# Patient Record
Sex: FEMALE | Race: WHITE | NOT HISPANIC OR LATINO | Employment: STUDENT | URBAN - METROPOLITAN AREA
[De-identification: names, ages, dates, MRNs, and addresses within clinical notes are randomized per-mention and may not be internally consistent; named-entity substitution may affect disease eponyms.]

---

## 2022-09-28 PROCEDURE — U0004 COV-19 TEST NON-CDC HGH THRU: HCPCS | Performed by: FAMILY MEDICINE

## 2023-08-21 ENCOUNTER — OFFICE VISIT (OUTPATIENT)
Dept: OBSTETRICS AND GYNECOLOGY | Facility: CLINIC | Age: 23
End: 2023-08-21
Payer: COMMERCIAL

## 2023-08-21 VITALS
SYSTOLIC BLOOD PRESSURE: 125 MMHG | HEIGHT: 64 IN | WEIGHT: 129.6 LBS | DIASTOLIC BLOOD PRESSURE: 86 MMHG | BODY MASS INDEX: 22.13 KG/M2 | HEART RATE: 96 BPM

## 2023-08-21 DIAGNOSIS — Z01.818 PRE-PROCEDURAL EXAMINATION: ICD-10-CM

## 2023-08-21 DIAGNOSIS — Z30.09 BIRTH CONTROL COUNSELING: Primary | ICD-10-CM

## 2023-08-21 PROCEDURE — 99203 OFFICE O/P NEW LOW 30 MIN: CPT | Performed by: OBSTETRICS & GYNECOLOGY

## 2023-08-21 RX ORDER — ALPRAZOLAM 0.5 MG/1
0.5 TABLET ORAL ONCE
Qty: 1 TABLET | Refills: 0 | Status: SHIPPED | OUTPATIENT
Start: 2023-08-21 | End: 2023-08-21

## 2023-08-21 NOTE — PROGRESS NOTES
Chief Complaint  New Gyn (Patient is here today for a birth control consult- would like an IUD unsure of which one. )    Subjective        Lucinda Alexadner presents to [facility]  History of Present Illness  Patient is here to discuss contraception.  She has never been sexually active previously.  She has never used any form of contraception previously.  She is interested in an IUD.  Her close friend has a Kyleena IUD, and she thinks she would like the same thing.  Patient does currently use tampons but she has a period and does well with these.  She has never previously had a pelvic exam performed.  She reports menstrual cycles that are about 7 days long.  They are moderate in flow.  She reports minimal cramping.  The patient is currently enrolled in college at the [employer].  She says she has started dating someone and thinks that she may become sexually active, and would like contraception before this starts.    OB Hx: G0 - never sexually active    History reviewed. No pertinent surgical history.    History reviewed. No pertinent past medical history.    Social History     Tobacco Use    Smoking status: Never    Smokeless tobacco: Never   Vaping Use    Vaping Use: Never used   Substance Use Topics    Alcohol use: Yes    Drug use: Defer     Family History   Problem Relation Age of Onset    Breast cancer Neg Hx     Ovarian cancer Neg Hx     Colon cancer Neg Hx     Uterine cancer Neg Hx      Current Outpatient Medications on File Prior to Visit   Medication Sig    azithromycin (Zithromax Z-Yordan) 250 MG tablet Take 2 tablets the first day, then 1 tablet daily for 4 days.     No current facility-administered medications on file prior to visit.     No Known Allergies    Review of systems:  Constitutional: No fevers, chills, sweats   Eye: No recent visual problems, denies blurry vision   HEENT: No ear pain, nasal congestion, sore throat, voice changes   Respiratory: No shortness of breath,  "cough, pain on breathing   Cardiovascular: No Chest pain, palpitations   Gastrointestinal: No nausea, vomiting, diarrhea, constipation   Genitourinary: No hematuria, dysuria, lesions on genitalia   Hema/Lymph: Negative for bruising, no edema   Endocrine: Negative for excessive thirst, excessive hunger, heat or cold intolerance   Musculoskeletal: No joint pain, muscle pain, decreased range of motion   Integumentary: No rash, pruritus, abrasions, lesions   Neurologic: No weakness, numbness, headaches   Psychiatric: No anxiety, depression, mood changes       Objective   Vital Signs:  /86   Pulse 96   Ht 162.6 cm (64\")   Wt 58.8 kg (129 lb 9.6 oz)   BMI 22.25 kg/mý   Estimated body mass index is 22.25 kg/mý as calculated from the following:    Height as of this encounter: 162.6 cm (64\").    Weight as of this encounter: 58.8 kg (129 lb 9.6 oz).  Facility age limit for growth percentiles is 20 years.    BMI is within normal parameters. No other follow-up for BMI required.      Physical Exam   General: No acute distress, awake and oriented x3  Psychiatric: good judgment and insight, normal mood  Neurological: cranial nerves II through XII intact, no deficits    Result Review :                   Assessment and Plan   Diagnoses and all orders for this visit:    1. Birth control counseling (Primary)    2. Pre-procedural examination  -     ALPRAZolam (Xanax) 0.5 MG tablet; Take 1 tablet by mouth 1 (One) Time for 1 dose. Prior to IUD insertion  Dispense: 1 tablet; Refill: 0    Various contraceptive options discussed including combined oral contraceptives, contraceptive patch, NuvaRing, progesterone only contraceptive, Depo-Provera, Nexplanon, progesterone IUD, copper IUD, and barrier methods. Patient elects progesterone intrauterine device. Risks, benefits, alternatives discussed including risks of bleeding, infection, subfertility, infertility, scarring, pelvic pain, irregular bleeding, amenorrhea, dyspareunia, " migration/perforation/expulsion of device, possible need for surgery for repair, unwanted/ectopic pregnancy, weight gain, and need for surgical removal of device. All questions answered.  Given her nulligravida status, I feel she would do better likely with Kyleena and then with Mirena given the smaller size of the device.  Educational materials on Kyleena were provided to the patient.  I would recommend insertion during her menstrual cycle.  We will obtain prior authorization for the device and contact the patient once we have received this.  She should then schedule during her menstrual cycle.  Recommend that she take ibuprofen 30 minutes prior to the insertion time.  I will also send in a prescription for Xanax x1 which she may take just prior to insertion to help with discomfort with the exam.  I recommend that she have someone bring her to the next appointment so she does not have to drive home.  PDMP queried and appropriate.    I spent 30 minutes today on the care of this patient, including review of the chart and any pertinent prior imaging and labs, interview/exam of the patient, developing care plan, and counseling the patient on management options and excluding any time spent on other separate billable services such as performing procedures or test interpretation, if applicable.           Follow Up   Return After PA for Kyleena.  Patient was given instructions and counseling regarding her condition or for health maintenance advice. Please see specific information pulled into the AVS if appropriate.

## 2023-08-22 ENCOUNTER — TELEPHONE (OUTPATIENT)
Dept: OBSTETRICS AND GYNECOLOGY | Facility: CLINIC | Age: 23
End: 2023-08-22
Payer: COMMERCIAL

## 2023-08-22 NOTE — TELEPHONE ENCOUNTER
Caller: Lucinda Alexander    Relationship: Self    Best call back number: [phone]     What form or medical record are you requesting: SCHOOL NOTE    Who is requesting this form or medical record from you: PROFESSOR     How would you like to receive the form or medical records (pick-up, mail, fax): [facility]      Timeframe paperwork needed: AS SOON AS POSSIBLE     Additional notes: PT IS REQUESTING SCHOOL NOTE FOR NEW GYN APPT ON 08/21/23

## 2023-08-23 ENCOUNTER — PATIENT MESSAGE (OUTPATIENT)
Dept: OBSTETRICS AND GYNECOLOGY | Facility: CLINIC | Age: 23
End: 2023-08-23
Payer: COMMERCIAL

## 2023-08-23 ENCOUNTER — PATIENT ROUNDING (BHMG ONLY) (OUTPATIENT)
Dept: OBSTETRICS AND GYNECOLOGY | Facility: CLINIC | Age: 23
End: 2023-08-23
Payer: COMMERCIAL

## 2023-08-28 ENCOUNTER — TELEPHONE (OUTPATIENT)
Dept: OBSTETRICS AND GYNECOLOGY | Facility: CLINIC | Age: 23
End: 2023-08-28
Payer: COMMERCIAL

## 2023-08-28 NOTE — TELEPHONE ENCOUNTER
LM for patient to call back and schedule an u/s guided IUD insertion. Needs to be on her cycle at the time of appointment.

## 2023-08-29 ENCOUNTER — TELEPHONE (OUTPATIENT)
Dept: OBSTETRICS AND GYNECOLOGY | Facility: CLINIC | Age: 23
End: 2023-08-29
Payer: COMMERCIAL

## 2023-08-29 NOTE — TELEPHONE ENCOUNTER
Patient received call that IUD was in. Said her cycle will either be the first or second week in September. Could not find open appt with Dr Carrera.until Oct 2 at [place] location. Would you please look at his scheduled and see if you can find an appointment with Dr Resendiz and ultrasound. I will not be here rest of week. If you would please forward to Helen or Jayna would really appreciate it. Thank You.Pt Ph [phone]

## 2023-08-30 NOTE — TELEPHONE ENCOUNTER
Pt states she has started cycle and is wanting to schedule for insertion. She can only do after 3 or before 11 due to being in school. Pt says she is able to do next tues 9/5, there are currently no openings would you be ok with pt being added on?    If not, I can schedule pt another day. Does she still need to be on cycle or at least 2 wks no intercourse?    Please advise,   Thank you!

## 2023-08-31 ENCOUNTER — OFFICE VISIT (OUTPATIENT)
Dept: OBSTETRICS AND GYNECOLOGY | Facility: CLINIC | Age: 23
End: 2023-08-31
Payer: COMMERCIAL

## 2023-08-31 VITALS
WEIGHT: 133 LBS | BODY MASS INDEX: 22.71 KG/M2 | HEIGHT: 64 IN | DIASTOLIC BLOOD PRESSURE: 88 MMHG | SYSTOLIC BLOOD PRESSURE: 135 MMHG

## 2023-08-31 DIAGNOSIS — Z30.430 ENCOUNTER FOR IUD INSERTION: Primary | ICD-10-CM

## 2023-08-31 LAB
B-HCG UR QL: NEGATIVE
EXPIRATION DATE: NORMAL
INTERNAL NEGATIVE CONTROL: NORMAL
INTERNAL POSITIVE CONTROL: NORMAL
Lab: NORMAL

## 2023-08-31 NOTE — PROGRESS NOTES
CC: Here for IUD insertion    Procedure: Intrauterine device insertion     LNMP: 8/30/23   Pregnancy Test: negative  Current Contraception Method: condoms      Chief Complaint: IUD insertion.  Patient presents for IUD insertion.  She appears stable today and agrees to proceed with IUD placement.      Pre procedure indication 1) Desires Kyleena   Post procedure indication 1) Desires Kyleena     Counseling:  Patient was counseled on risks of IUD insertion including but not limited to abnormal bleeding, infection, uterine perforation, expulsion, failure of contraception resulting in pregnancy, need for additional procedures and/or need for surgery.  All questions were answered to apparent satisfaction.  She was counseled about the duration of treatment, recommended removal in 5 years.  She was advised to perform monthly string checks and to see evaluation with unexpected changes in bleeding patterns and/or unable to feel the strings. The risks, benefits, and alternatives to IUD were explained at length with the patient. All her questions were answered and consents were signed.    The patient was placed in a dorsal lithotomy position on the examining table in [facility]. A bimanual exam confirmed the uterus was normal in size, anteflexed.  A warmed metal speculum was inserted into the vagina and the cervix was brought into view. IUD was placed under ultrasound.    The cervix was prepped with Betadine. A tenaculum was used to grasp the cervix. The endometrial cavity was then sounded to 7 cm without use of a dilator.    The  was then carefully advanced to the cervical canal into the uterus to the level of the fundus. This was then backed off about 1.5-2 cm to allow sufficient space for the arms to open. The device was deployed. The  was removed carefully from the uterus. The threads were then cut leaving 2-3 cm visible outside of the cervix.   Good hemostasis was noted.     All other instruments were removed  from the vagina. There were no complications.  The patient tolerated the procedure well with a minimal amount of discomfort.    The patient was counseled about the need to return in 4 weeks for string check.     She was counseled about the need to use a backup method of contraception such as condoms for 1-2 weeks. The patient is counseled to contact us if she has any significant or increasing bleeding, pain, fever, chills, or other concerns. She is instructed to see a doctor right away if she believes that she may be pregnant at any time with the IUD in place.    Diagnoses and all orders for this visit:    1. Encounter for IUD insertion (Primary)  -     POC Pregnancy, Urine        KATI Canales  8/31/2023  15:15 EDT

## 2023-09-28 ENCOUNTER — OFFICE VISIT (OUTPATIENT)
Dept: OBSTETRICS AND GYNECOLOGY | Facility: CLINIC | Age: 23
End: 2023-09-28
Payer: COMMERCIAL

## 2023-09-28 VITALS
SYSTOLIC BLOOD PRESSURE: 123 MMHG | DIASTOLIC BLOOD PRESSURE: 81 MMHG | BODY MASS INDEX: 22.77 KG/M2 | HEIGHT: 64 IN | WEIGHT: 133.4 LBS

## 2023-09-28 DIAGNOSIS — Z30.431 IUD CHECK UP: Primary | ICD-10-CM

## 2023-09-28 NOTE — PROGRESS NOTES
"Chief Complaint   Patient presents with    Follow-up     Patient is here today to have her IUD strings checked. Kyleena inserted 8/31        SUBJECTIVE:     Patient present today for her IUD string check. She has the kyleena and it was placed on 8/31/23. She reports spotting since placement. She is happy with the IUD. She has questions about TVUS results which indicated PCO ovaries. Prior to Kyleena she had a regular period every 28-30 days.     Review of Systems   Constitutional:  Negative for chills, fatigue and fever.   Gastrointestinal:  Negative for abdominal distention and abdominal pain.   Genitourinary:  Positive for vaginal bleeding. Negative for dyspareunia, dysuria, menstrual problem, pelvic pain, vaginal discharge and vaginal pain.   Musculoskeletal:  Negative for back pain.     OBJECTIVE:   Vitals:    09/28/23 1535   BP: 123/81   Weight: 60.5 kg (133 lb 6.4 oz)   Height: 162.6 cm (64.02\")        Physical Exam  Constitutional:       General: She is not in acute distress.     Appearance: Normal appearance. She is not ill-appearing, toxic-appearing or diaphoretic.   Genitourinary:      Bladder and urethral meatus normal.      No lesions in the vagina.      Right Labia: No rash, tenderness, lesions, skin changes or Bartholin's cyst.     Left Labia: No tenderness, lesions, skin changes, Bartholin's cyst or rash.     No labial fusion noted.      No inguinal adenopathy present in the right or left side.     Vaginal bleeding (small) present.      No vaginal discharge, erythema, tenderness, ulceration or granulation tissue.      No vaginal prolapse present.     No vaginal atrophy present.       Right Adnexa: not tender, not full, not palpable, no mass present and not absent.     Left Adnexa: not tender, not full, not palpable, no mass present and not absent.     No cervical motion tenderness, discharge, friability, lesion, polyp, nabothian cyst or eversion.      IUD strings visualized.      Uterus is not " enlarged, fixed, tender, irregular or prolapsed.      No uterine mass detected.  Abdominal:      General: There is no distension.      Palpations: Abdomen is soft. There is no mass.      Tenderness: There is no abdominal tenderness. There is no guarding.      Hernia: No hernia is present. There is no hernia in the left inguinal area or right inguinal area.   Lymphadenopathy:      Lower Body: No right inguinal adenopathy. No left inguinal adenopathy.   Neurological:      Mental Status: She is alert.   Vitals and nursing note reviewed.       Assessment/Plan    Diagnoses and all orders for this visit:    1. IUD check up (Primary)    IUD strings noted on exam  Discussed expectations for vaginal bleeding following IUD placement  Rev IUD string checks and when to perform. Instructed to call office with any concerns  Reviewed TVUS results with pt, discussed PCO ovaries and what this may suggest. She had regular menses prior to IUD placement  She is due for AE, encouraged to schedule in the next several months.     Follow up: PRN and for AE    I spent 25 minutes caring for Lucinda on this date of service. This time includes time spent by me in the following activities: preparing for the visit, reviewing tests, obtaining and/or reviewing a separately obtained history, performing a medically appropriate examination and/or evaluation, counseling and educating the patient/family/caregiver, ordering medications, tests, or procedures, referring and communicating with other health care professionals, and documenting information in the medical record    Silvia Sosa, APRN  9/28/2023  17:25 EDT

## 2024-02-18 NOTE — PROGRESS NOTES
"GYN ANNUAL EXAM     Chief Complaint   Patient presents with    Annual Exam     AE and first pap and check IUD       HPI    Lucinda is a 23 y.o. female who presents for annual well woman exam. She is a patient of Dr. Carrera.     OB History    No obstetric history on file.         SUBJECTIVE    MENSTRUAL & SEXUAL HEALTH  LMP: No LMP recorded. Patient has had an implant.  Currently sexually active: is  Sexual preference: men  Menses regularity:  n/a - has Kyleena with no periods  Menses length: n/a - has Kyleena with no periods  Dysmenorrhea: n/a - has Kyleena with no periods  Cyclic symptoms: n/a - has Kyleena with no periods  Current contraception: IUD (Kyleena)  Last pap: n/a  History of abnormal pap: n/a  History of STD: No  Family history of cancer: denies       Family history of breast cancer: no  Performs SBE: performs when she can remember.   Incontinence: no  Dyspareunia: no    History reviewed. No pertinent past medical history.    History reviewed. No pertinent surgical history.      Current Outpatient Medications:     levonorgestrel (Kyleena) 19.5 MG intrauterine device IUD, To be inserted one time by prescriber. Route intrauterine., Disp: 1 each, Rfl: 0    spironolactone (ALDACTONE) 100 MG tablet, Take 1 tablet by mouth Daily., Disp: , Rfl:     azithromycin (Zithromax Z-Yordan) 250 MG tablet, Take 2 tablets the first day, then 1 tablet daily for 4 days., Disp: 6 tablet, Rfl: 0    No Known Allergies    Social History     Tobacco Use    Smoking status: Never    Smokeless tobacco: Never   Vaping Use    Vaping Use: Never used   Substance Use Topics    Alcohol use: Yes    Drug use: Never       Family History   Problem Relation Age of Onset    Breast cancer Neg Hx     Ovarian cancer Neg Hx     Colon cancer Neg Hx     Uterine cancer Neg Hx        Review of Systems   All other systems reviewed and are negative.      OBJECTIVE    /87   Ht 162.6 cm (64.02\")   Wt 59.4 kg (131 lb)   BMI 22.47 kg/m²     Physical " Exam  Constitutional:       General: She is awake. She is not in acute distress.     Appearance: Normal appearance. She is well-developed, well-groomed and normal weight. She is not ill-appearing.   Genitourinary:      Vulva and urethral meatus normal.      No lesions in the vagina.      Right Labia: No rash, tenderness, lesions or skin changes.     Left Labia: No tenderness, lesions, skin changes or rash.     No labial fusion noted.      No inguinal adenopathy present in the right or left side.     No vaginal discharge, tenderness or ulceration.        Right Adnexa: not tender and no mass present.     Left Adnexa: not tender and no mass present.     No cervical discharge, lesion, polyp, eversion or elongation.      IUD strings visualized.      No urethral prolapse or discharge present.      Bladder is not tender.       Pelvic exam was performed with patient in the lithotomy position.   Breasts:     Breasts are soft.     Right: No bleeding, inverted nipple, mass, nipple discharge, skin change or tenderness.      Left: No bleeding, inverted nipple, mass, nipple discharge, skin change or tenderness.   HENT:      Head: Normocephalic and atraumatic.   Eyes:      General: No scleral icterus.     Conjunctiva/sclera: Conjunctivae normal.   Cardiovascular:      Rate and Rhythm: Normal rate and regular rhythm.      Heart sounds: Normal heart sounds.   Pulmonary:      Effort: Pulmonary effort is normal.      Breath sounds: Normal breath sounds.   Abdominal:      Palpations: Abdomen is soft.      Tenderness: There is no abdominal tenderness. There is no guarding or rebound.      Hernia: There is no hernia in the left inguinal area or right inguinal area.   Musculoskeletal:         General: Normal range of motion.      Cervical back: Normal range of motion.   Lymphadenopathy:      Upper Body:      Right upper body: No supraclavicular or axillary adenopathy.      Left upper body: No supraclavicular or axillary adenopathy.       Lower Body: No right inguinal adenopathy. No left inguinal adenopathy.   Neurological:      Mental Status: She is alert and oriented to person, place, and time.   Skin:     General: Skin is warm and dry.      Coloration: Skin is not jaundiced or pale.   Psychiatric:         Behavior: Behavior normal. Behavior is cooperative.   Vitals reviewed.         ASSESSMENT     Diagnoses and all orders for this visit:    1. Encounter for gynecological examination without abnormal finding (Primary)  -     IGP,CtNgTv,rfx Apt HPV All    2. Encounter for screening for malignant neoplasm of vagina  -     IGP,CtNgTv,rfx Apt HPV All    3. Screening for malignant neoplasm of cervix  -     IGP,CtNgTv,rfx Apt HPV All    4. Encounter for screening examination for sexually transmitted disease  -     IGP,CtNgTv,rfx Apt HPV All    5. Encounter for breast cancer screening using non-mammogram modality    6. IUD (intrauterine device) in place       PLAN   WELL WOMAN EXAM: Pap smear collected today. Recommend MVI daily.    CONTRACEPTION: IUD - PAINS discussed. Encouraged to check strings.   STD: STD screen today- desires - Pap ., encouraged use of condoms.  SMOKING STATUS: non smoker.  BREAST HEALTH: Encouraged annual mammogram screening starting at age 40. Instructed on how to perform SBE. Encouraged breast health self awareness.  EXERCISE: Encouraged 150 minutes of exercise per week if not medially contraindicated.   BMI: Body mass index is 22.47 kg/m².     Return in about 1 year (around 2/20/2025) for annual physical.    I spent 15 minutes caring for Lucinda on this date of service. This time includes time spent by me in the following activities: preparing for the visit, reviewing tests, obtaining and/or reviewing a separately obtained history, performing a medically appropriate examination and/or evaluation, counseling and educating the patient/family/caregiver, ordering medications, tests, or procedures, referring and communicating with other  health care professionals, documenting information in the medical record, independently interpreting results and communicating that information with the patient/family/caregiver, and care coordination.    Jess Umana CNM  2/21/2024  19:30 EST

## 2024-02-20 ENCOUNTER — OFFICE VISIT (OUTPATIENT)
Dept: OBSTETRICS AND GYNECOLOGY | Facility: CLINIC | Age: 24
End: 2024-02-20
Payer: COMMERCIAL

## 2024-02-20 VITALS
SYSTOLIC BLOOD PRESSURE: 131 MMHG | BODY MASS INDEX: 22.36 KG/M2 | WEIGHT: 131 LBS | HEIGHT: 64 IN | DIASTOLIC BLOOD PRESSURE: 87 MMHG

## 2024-02-20 DIAGNOSIS — Z97.5 IUD (INTRAUTERINE DEVICE) IN PLACE: ICD-10-CM

## 2024-02-20 DIAGNOSIS — Z12.39 ENCOUNTER FOR BREAST CANCER SCREENING USING NON-MAMMOGRAM MODALITY: ICD-10-CM

## 2024-02-20 DIAGNOSIS — Z01.419 ENCOUNTER FOR GYNECOLOGICAL EXAMINATION WITHOUT ABNORMAL FINDING: Primary | ICD-10-CM

## 2024-02-20 DIAGNOSIS — Z12.72 ENCOUNTER FOR SCREENING FOR MALIGNANT NEOPLASM OF VAGINA: ICD-10-CM

## 2024-02-20 DIAGNOSIS — Z12.4 SCREENING FOR MALIGNANT NEOPLASM OF CERVIX: ICD-10-CM

## 2024-02-20 DIAGNOSIS — Z11.3 ENCOUNTER FOR SCREENING EXAMINATION FOR SEXUALLY TRANSMITTED DISEASE: ICD-10-CM

## 2024-02-20 RX ORDER — SPIRONOLACTONE 100 MG/1
1 TABLET, FILM COATED ORAL DAILY
COMMUNITY

## 2024-02-23 LAB
C TRACH RRNA CVX QL NAA+PROBE: NEGATIVE
CONV .: NORMAL
CYTOLOGIST CVX/VAG CYTO: NORMAL
CYTOLOGY CVX/VAG DOC CYTO: NORMAL
CYTOLOGY CVX/VAG DOC THIN PREP: NORMAL
DX ICD CODE: NORMAL
HIV 1 & 2 AB SER-IMP: NORMAL
N GONORRHOEA RRNA CVX QL NAA+PROBE: NEGATIVE
OTHER STN SPEC: NORMAL
STAT OF ADQ CVX/VAG CYTO-IMP: NORMAL
T VAGINALIS RRNA SPEC QL NAA+PROBE: NEGATIVE